# Patient Record
Sex: MALE | URBAN - METROPOLITAN AREA
[De-identification: names, ages, dates, MRNs, and addresses within clinical notes are randomized per-mention and may not be internally consistent; named-entity substitution may affect disease eponyms.]

---

## 2023-09-20 ENCOUNTER — APPOINTMENT (RX ONLY)
Dept: URBAN - METROPOLITAN AREA CLINIC 114 | Facility: CLINIC | Age: 48
Setting detail: DERMATOLOGY
End: 2023-09-20

## 2023-09-20 DIAGNOSIS — L0390 CELLULITIS AND ABSCESS OF UNSPECIFIED SITES: ICD-10-CM | Status: WORSENING

## 2023-09-20 DIAGNOSIS — L73.2 HIDRADENITIS SUPPURATIVA: ICD-10-CM | Status: WORSENING

## 2023-09-20 DIAGNOSIS — L0391 CELLULITIS AND ABSCESS OF UNSPECIFIED SITES: ICD-10-CM | Status: WORSENING

## 2023-09-20 PROBLEM — L03.114 CELLULITIS OF LEFT UPPER LIMB: Status: ACTIVE | Noted: 2023-09-20

## 2023-09-20 PROCEDURE — ? ADDITIONAL NOTES

## 2023-09-20 PROCEDURE — ? ORDER TESTS

## 2023-09-20 PROCEDURE — ? PRESCRIPTION

## 2023-09-20 PROCEDURE — ? COUNSELING

## 2023-09-20 PROCEDURE — 99204 OFFICE O/P NEW MOD 45 MIN: CPT

## 2023-09-20 RX ORDER — DOXYCYCLINE HYCLATE 100 MG/1
TABLET, COATED ORAL
Qty: 60 | Refills: 1 | Status: ERX | COMMUNITY
Start: 2023-09-20

## 2023-09-20 RX ADMIN — DOXYCYCLINE HYCLATE: 100 TABLET, COATED ORAL at 00:00

## 2023-09-20 ASSESSMENT — LOCATION ZONE DERM
LOCATION ZONE: TRUNK
LOCATION ZONE: LEG
LOCATION ZONE: ARM

## 2023-09-20 ASSESSMENT — LOCATION SIMPLE DESCRIPTION DERM
LOCATION SIMPLE: RIGHT BUTTOCK
LOCATION SIMPLE: LEFT UPPER ARM
LOCATION SIMPLE: RIGHT THIGH
LOCATION SIMPLE: LEFT BUTTOCK
LOCATION SIMPLE: LEFT THIGH

## 2023-09-20 ASSESSMENT — HURLEY STAGE
IN YOUR EXPERIENCE, AMONG ALL PATIENTS YOU HAVE SEEN WITH THIS CONDITION, HOW SEVERE IS THIS PATIENT'S CONDITION?: HURLEY STAGE III: MULTIPLE INTERCONNECTED SINUS TRACTS AND ABSCESSES THROUGHOUT AN ENTIRE AREA

## 2023-09-20 ASSESSMENT — LOCATION DETAILED DESCRIPTION DERM
LOCATION DETAILED: LEFT ANTERIOR PROXIMAL THIGH
LOCATION DETAILED: LEFT BUTTOCK
LOCATION DETAILED: LEFT ANTERIOR MEDIAL PROXIMAL UPPER ARM
LOCATION DETAILED: RIGHT BUTTOCK
LOCATION DETAILED: RIGHT ANTERIOR PROXIMAL THIGH

## 2023-09-20 NOTE — PROCEDURE: ADDITIONAL NOTES
Render Risk Assessment In Note?: no
Additional Notes: .\\n9/20\\nCurrently on Humira for rheumatoid arthritis and Crohn’s, pt has Crohn’s disease so cosentyx is not a good idea. For now we will see if the doxycycline helps and if it does we can do that on a low dose for longer term. Pt reports humira worsened his HS which is unfortunate, hopefully halina inhibitors will get approval for HS in near future that can also help his Crohn’s disease \\nRemicade helped HS but not Crohns
Detail Level: Simple

## 2023-09-20 NOTE — PROCEDURE: ORDER TESTS
Expected Date Of Service: 09/20/2023
Lab Facility: [id number]
Bill For Surgical Tray: no
Billing Type: Third-Party Bill
Performing Laboratory: [id number]

## 2023-09-22 ENCOUNTER — APPOINTMENT (RX ONLY)
Dept: URBAN - METROPOLITAN AREA CLINIC 114 | Facility: CLINIC | Age: 48
Setting detail: DERMATOLOGY
End: 2023-09-22

## 2023-09-22 DIAGNOSIS — L73.2 HIDRADENITIS SUPPURATIVA: ICD-10-CM

## 2023-09-22 DIAGNOSIS — L82.1 OTHER SEBORRHEIC KERATOSIS: ICD-10-CM

## 2023-09-22 DIAGNOSIS — L0391 CELLULITIS AND ABSCESS OF UNSPECIFIED SITES: ICD-10-CM

## 2023-09-22 DIAGNOSIS — L0390 CELLULITIS AND ABSCESS OF UNSPECIFIED SITES: ICD-10-CM

## 2023-09-22 PROBLEM — L02.414 CUTANEOUS ABSCESS OF LEFT UPPER LIMB: Status: ACTIVE | Noted: 2023-09-22

## 2023-09-22 PROBLEM — L03.114 CELLULITIS OF LEFT UPPER LIMB: Status: ACTIVE | Noted: 2023-09-22

## 2023-09-22 PROCEDURE — ? INTRALESIONAL KENALOG

## 2023-09-22 PROCEDURE — ? PRESCRIPTION MEDICATION MANAGEMENT

## 2023-09-22 PROCEDURE — ? COUNSELING

## 2023-09-22 PROCEDURE — 99214 OFFICE O/P EST MOD 30 MIN: CPT | Mod: 25

## 2023-09-22 PROCEDURE — ? ADDITIONAL NOTES

## 2023-09-22 PROCEDURE — 11900 INJECT SKIN LESIONS </W 7: CPT

## 2023-09-22 ASSESSMENT — LOCATION SIMPLE DESCRIPTION DERM
LOCATION SIMPLE: LEFT THIGH
LOCATION SIMPLE: RIGHT THIGH
LOCATION SIMPLE: LEFT UPPER ARM
LOCATION SIMPLE: RIGHT BUTTOCK
LOCATION SIMPLE: LEFT BUTTOCK

## 2023-09-22 ASSESSMENT — LOCATION DETAILED DESCRIPTION DERM
LOCATION DETAILED: LEFT BUTTOCK
LOCATION DETAILED: LEFT ANTERIOR PROXIMAL THIGH
LOCATION DETAILED: RIGHT BUTTOCK
LOCATION DETAILED: LEFT ANTERIOR MEDIAL PROXIMAL UPPER ARM
LOCATION DETAILED: RIGHT ANTERIOR PROXIMAL THIGH

## 2023-09-22 ASSESSMENT — LOCATION ZONE DERM
LOCATION ZONE: TRUNK
LOCATION ZONE: ARM
LOCATION ZONE: LEG

## 2023-09-22 NOTE — PROCEDURE: ADDITIONAL NOTES
Render Risk Assessment In Note?: no
Additional Notes: ..\\n.\\n9/22\\nI advised pt to think about Rinvoq for both Crohns (approved ) and for HS (currently under trials not approved), as halina inhibitors may help both conditions since he is struggling with HS \\n\\n9/20\\nCurrently on Humira for rheumatoid arthritis and Crohn’s, pt has Crohn’s disease so cosentyx is not a good idea. For now we will see if the doxycycline helps and if it does we can do that on a low dose for longer term. Pt reports humira worsened his HS which is unfortunate, hopefully halina inhibitors will get approval for HS in near future that can also help his Crohn’s disease \\nRemicade helped HS but not Crohns
Detail Level: Simple
Additional Notes: .\\n.\\nAttempted I&D but the picket of pus appears to be very deep, I can not access it with local numbing, explained to pt that as he has this happen before if it worsens or he develops fever he needs to go to ER for surgical evacuation of abscess in the OR

## 2023-09-22 NOTE — PROCEDURE: INTRALESIONAL KENALOG
Bill For Wasted Drug?: no
How Many Mls Were Removed From The 40 Mg/Ml (10ml) Vial When Preparing The Injectable Solution?: 0
Kenalog Preparation: Kenalog
Validate Note Data When Using Inventory: Yes
Total Volume Injected (Ccs- Only Use Numbers And Decimals): 2
Medical Necessity Clause: This procedure was medically necessary because the lesions that were treated were:
Which Kenalog Vial Was Used?: Kenalog 10 mg/ml (5 ml vial)
Kenalog Type Of Vial: Multiple Dose
Consent: The risks of atrophy were reviewed with the patient.
Size Of Lesion (Optional): 5.1
Detail Level: Detailed
Administered By (Optional): dr Ivory
X Size Of Lesion In Cm (Optional): 4
Concentration Of Solution Injected (Mg/Ml): 5.0
How Many Mls Were Removed From The 10 Mg/Ml (5ml) Vial When Preparing The Injectable Solution?: 1

## 2023-09-27 ENCOUNTER — APPOINTMENT (RX ONLY)
Dept: URBAN - METROPOLITAN AREA CLINIC 114 | Facility: CLINIC | Age: 48
Setting detail: DERMATOLOGY
End: 2023-09-27

## 2023-09-27 DIAGNOSIS — A49.02 METHICILLIN RESISTANT STAPHYLOCOCCUS AUREUS INFECTION, UNSPECIFIED SITE: ICD-10-CM | Status: IMPROVED

## 2023-09-27 DIAGNOSIS — L73.2 HIDRADENITIS SUPPURATIVA: ICD-10-CM

## 2023-09-27 PROCEDURE — ? COUNSELING

## 2023-09-27 PROCEDURE — ? ADDITIONAL NOTES

## 2023-09-27 PROCEDURE — ? PRESCRIPTION MEDICATION MANAGEMENT

## 2023-09-27 PROCEDURE — 99214 OFFICE O/P EST MOD 30 MIN: CPT

## 2023-09-27 ASSESSMENT — LOCATION DETAILED DESCRIPTION DERM
LOCATION DETAILED: RIGHT BUTTOCK
LOCATION DETAILED: LEFT ANTERIOR MEDIAL PROXIMAL UPPER ARM
LOCATION DETAILED: LEFT BUTTOCK
LOCATION DETAILED: RIGHT ANTERIOR PROXIMAL THIGH
LOCATION DETAILED: LEFT ANTERIOR PROXIMAL THIGH

## 2023-09-27 ASSESSMENT — LOCATION ZONE DERM
LOCATION ZONE: TRUNK
LOCATION ZONE: LEG
LOCATION ZONE: ARM

## 2023-09-27 ASSESSMENT — LOCATION SIMPLE DESCRIPTION DERM
LOCATION SIMPLE: LEFT THIGH
LOCATION SIMPLE: RIGHT THIGH
LOCATION SIMPLE: LEFT UPPER ARM
LOCATION SIMPLE: LEFT BUTTOCK
LOCATION SIMPLE: RIGHT BUTTOCK

## 2023-09-27 NOTE — PROCEDURE: ADDITIONAL NOTES
Additional Notes: .\\n.\\n9/27/23\\nIf it happens again he should get on antibiotics immediately so it does not evolve further as it is resistant to clindamycin and levofloxacin.
Render Risk Assessment In Note?: no
Detail Level: Simple
Additional Notes: .\\n.\\n9/27/23\\nWill keep doxycycline on hand, has a refill and he will call if he is running out so we can send in refills for him\\n..\\n.\\n9/22\\nI advised pt to think about Rinvoq for both Crohns (approved ) and for HS (currently under trials not approved), as halina inhibitors may help both conditions since he is struggling with HS \\n\\n9/20\\nCurrently on Humira for rheumatoid arthritis and Crohn’s, pt has Crohn’s disease so cosentyx is not a good idea. For now we will see if the doxycycline helps and if it does we can do that on a low dose for longer term. Pt reports humira worsened his HS which is unfortunate, hopefully halina inhibitors will get approval for HS in near future that can also help his Crohn’s disease \\nRemicade helped HS but not Crohns

## 2023-09-27 NOTE — PROCEDURE: MIPS QUALITY
From: Dorinda Lora  To: Jaki Reynoso  Sent: 4/14/2021 6:05 PM CDT  Subject: Upcoming Appointment    Forms for consultation on 4/20  
Quality 110: Preventive Care And Screening: Influenza Immunization: Influenza Immunization Administered during Influenza season
Detail Level: Detailed

## 2023-09-27 NOTE — PROCEDURE: PRESCRIPTION MEDICATION MANAGEMENT
Detail Level: Zone
Render In Strict Bullet Format?: No
Continue Regimen: Doxycycline 100 mg bid with each flare for 1-4 weeks depending on severity \\nPt is to continue Humira injections

## 2023-10-23 ENCOUNTER — APPOINTMENT (RX ONLY)
Dept: URBAN - METROPOLITAN AREA CLINIC 114 | Facility: CLINIC | Age: 48
Setting detail: DERMATOLOGY
End: 2023-10-23

## 2023-10-23 DIAGNOSIS — B07.8 OTHER VIRAL WARTS: ICD-10-CM

## 2023-10-23 DIAGNOSIS — L73.2 HIDRADENITIS SUPPURATIVA: ICD-10-CM | Status: IMPROVED

## 2023-10-23 PROCEDURE — 99214 OFFICE O/P EST MOD 30 MIN: CPT | Mod: 25

## 2023-10-23 PROCEDURE — ? PRESCRIPTION

## 2023-10-23 PROCEDURE — 17110 DESTRUCTION B9 LES UP TO 14: CPT

## 2023-10-23 PROCEDURE — ? COUNSELING

## 2023-10-23 PROCEDURE — ? ADDITIONAL NOTES

## 2023-10-23 PROCEDURE — ? PRESCRIPTION MEDICATION MANAGEMENT

## 2023-10-23 PROCEDURE — ? LIQUID NITROGEN

## 2023-10-23 RX ORDER — CLINDAMYCIN PHOSPHATE 10 MG/G
GEL TOPICAL
Qty: 60 | Refills: 6 | Status: ERX | COMMUNITY
Start: 2023-10-23

## 2023-10-23 RX ADMIN — CLINDAMYCIN PHOSPHATE: 10 GEL TOPICAL at 00:00

## 2023-10-23 ASSESSMENT — LOCATION ZONE DERM
LOCATION ZONE: ARM
LOCATION ZONE: TRUNK
LOCATION ZONE: LEG

## 2023-10-23 ASSESSMENT — LOCATION DETAILED DESCRIPTION DERM
LOCATION DETAILED: LEFT BUTTOCK
LOCATION DETAILED: RIGHT BUTTOCK
LOCATION DETAILED: RIGHT ANTERIOR PROXIMAL THIGH
LOCATION DETAILED: LEFT ANTERIOR MEDIAL PROXIMAL UPPER ARM
LOCATION DETAILED: LEFT ANTERIOR PROXIMAL THIGH
LOCATION DETAILED: RIGHT PROXIMAL DORSAL FOREARM

## 2023-10-23 ASSESSMENT — LOCATION SIMPLE DESCRIPTION DERM
LOCATION SIMPLE: LEFT UPPER ARM
LOCATION SIMPLE: LEFT BUTTOCK
LOCATION SIMPLE: RIGHT FOREARM
LOCATION SIMPLE: RIGHT THIGH
LOCATION SIMPLE: RIGHT BUTTOCK
LOCATION SIMPLE: LEFT THIGH

## 2023-10-23 NOTE — PROCEDURE: ADDITIONAL NOTES
Render Risk Assessment In Note?: no
Additional Notes: 10/23/23\\nThe doxycycline does help. The best way to treat the ones that are constant is to removed the entire tract by excising the area. We will give him information to a general surgeon.\\nHis doctor asked about combining biologics, it is not studied and it is not risk free so I do not believe it is a great idea since they are all immunosuppressants.\\n.\\n.\\n9/27/23\\nWill keep doxycycline on hand, has a refill and he will call if he is running out so we can send in refills for him\\n..\\n.\\n9/22\\nI advised pt to think about Rinvoq for both Crohns (approved ) and for HS (currently under trials not approved), as halina inhibitors may help both conditions since he is struggling with HS \\n\\n9/20\\nCurrently on Humira for rheumatoid arthritis and Crohn’s, pt has Crohn’s disease so cosentyx is not a good idea. For now we will see if the doxycycline helps and if it does we can do that on a low dose for longer term. Pt reports humira worsened his HS which is unfortunate, hopefully halina inhibitors will get approval for HS in near future that can also help his Crohn’s disease \\nRemicade helped HS but not Crohns
Detail Level: Simple

## 2023-10-23 NOTE — PROCEDURE: LIQUID NITROGEN
Spray Paint Text: The liquid nitrogen was applied to the skin utilizing a spray paint frosting technique.
Show Applicator Variable?: Yes
Add 52 Modifier (Optional): no
Consent: The patient's consent was obtained including but not limited to risks of crusting, scabbing, blistering, scarring, darker or lighter pigmentary change, recurrence, incomplete removal and infection.
Number Of Freeze-Thaw Cycles: 1 freeze-thaw cycle
Medical Necessity Clause: This procedure was medically necessary because the lesions that were treated were:
Medical Necessity Information: It is in your best interest to select a reason for this procedure from the list below. All of these items fulfill various CMS LCD requirements except the new and changing color options.
Detail Level: Detailed
Post-Care Instructions: I reviewed with the patient in detail post-care instructions. Patient is to wear sunprotection, and avoid picking at any of the treated lesions. Pt may apply Vaseline to crusted or scabbing areas.
Duration Of Freeze Thaw-Cycle (Seconds): 3

## 2023-10-23 NOTE — PROCEDURE: PRESCRIPTION MEDICATION MANAGEMENT
Initiate Treatment: clindamycin 1 % topical gel Apply to affected areas bid prn
Detail Level: Zone
Render In Strict Bullet Format?: No
Plan: Since doxycycline can cause sun sensitivity I recommend zinc sunscreens
Continue Regimen: Doxycycline 100 mg bid with each flare for 1-4 weeks depending on severity \\nPt is to continue Humira injections

## 2023-11-30 ENCOUNTER — APPOINTMENT (RX ONLY)
Dept: URBAN - METROPOLITAN AREA CLINIC 114 | Facility: CLINIC | Age: 48
Setting detail: DERMATOLOGY
End: 2023-11-30

## 2023-11-30 DIAGNOSIS — L08.9 LOCAL INFECTION OF THE SKIN AND SUBCUTANEOUS TISSUE, UNSPECIFIED: ICD-10-CM

## 2023-11-30 DIAGNOSIS — L40.8 OTHER PSORIASIS: ICD-10-CM

## 2023-11-30 DIAGNOSIS — R21 RASH AND OTHER NONSPECIFIC SKIN ERUPTION: ICD-10-CM

## 2023-11-30 DIAGNOSIS — L73.2 HIDRADENITIS SUPPURATIVA: ICD-10-CM

## 2023-11-30 PROCEDURE — ? COUNSELING

## 2023-11-30 PROCEDURE — ? PRESCRIPTION

## 2023-11-30 PROCEDURE — ? ADDITIONAL NOTES

## 2023-11-30 PROCEDURE — ? ORDER TESTS

## 2023-11-30 PROCEDURE — ? PRESCRIPTION MEDICATION MANAGEMENT

## 2023-11-30 PROCEDURE — 11104 PUNCH BX SKIN SINGLE LESION: CPT

## 2023-11-30 PROCEDURE — ? DIAGNOSIS COMMENT

## 2023-11-30 PROCEDURE — 99214 OFFICE O/P EST MOD 30 MIN: CPT | Mod: 25

## 2023-11-30 PROCEDURE — ? BIOPSY BY PUNCH METHOD

## 2023-11-30 RX ORDER — KETOCONAZOLE 20 MG/ML
SHAMPOO, SUSPENSION TOPICAL
Qty: 120 | Refills: 11 | Status: ERX | COMMUNITY
Start: 2023-11-30

## 2023-11-30 RX ORDER — FLUOCINOLONE ACETONIDE 0.11 MG/ML
OIL TOPICAL
Qty: 118.28 | Refills: 5 | Status: ERX | COMMUNITY
Start: 2023-11-30

## 2023-11-30 RX ADMIN — FLUOCINOLONE ACETONIDE: 0.11 OIL TOPICAL at 00:00

## 2023-11-30 RX ADMIN — KETOCONAZOLE: 20 SHAMPOO, SUSPENSION TOPICAL at 00:00

## 2023-11-30 ASSESSMENT — LOCATION SIMPLE DESCRIPTION DERM
LOCATION SIMPLE: RIGHT THIGH
LOCATION SIMPLE: RIGHT CHEEK
LOCATION SIMPLE: RIGHT SCALP
LOCATION SIMPLE: SCALP
LOCATION SIMPLE: LEFT BUTTOCK
LOCATION SIMPLE: LEFT UPPER ARM
LOCATION SIMPLE: LEFT THIGH
LOCATION SIMPLE: LEFT ANTERIOR NECK
LOCATION SIMPLE: RIGHT BUTTOCK

## 2023-11-30 ASSESSMENT — LOCATION DETAILED DESCRIPTION DERM
LOCATION DETAILED: LEFT ANTERIOR PROXIMAL THIGH
LOCATION DETAILED: LEFT CENTRAL FRONTAL SCALP
LOCATION DETAILED: RIGHT CENTRAL BUCCAL CHEEK
LOCATION DETAILED: LEFT ANTERIOR MEDIAL PROXIMAL UPPER ARM
LOCATION DETAILED: LEFT BUTTOCK
LOCATION DETAILED: LEFT INFERIOR LATERAL NECK
LOCATION DETAILED: LEFT SUPERIOR PARIETAL SCALP
LOCATION DETAILED: RIGHT MEDIAL FRONTAL SCALP
LOCATION DETAILED: RIGHT INFERIOR MEDIAL BUCCAL CHEEK
LOCATION DETAILED: RIGHT SUPERIOR LATERAL BUCCAL CHEEK
LOCATION DETAILED: RIGHT BUTTOCK
LOCATION DETAILED: RIGHT ANTERIOR PROXIMAL THIGH

## 2023-11-30 ASSESSMENT — LOCATION ZONE DERM
LOCATION ZONE: FACE
LOCATION ZONE: NECK
LOCATION ZONE: TRUNK
LOCATION ZONE: ARM
LOCATION ZONE: LEG
LOCATION ZONE: SCALP

## 2023-11-30 NOTE — PROCEDURE: ADDITIONAL NOTES
Render Risk Assessment In Note?: no
Additional Notes: 10/23/23\\nThe doxycycline does help. The best way to treat the ones that are constant is to removed the entire tract by excising the area. We will give him information to a general surgeon.\\nHis doctor asked about combining biologics, it is not studied and it is not risk free so I do not believe it is a great idea since they are all immunosuppressants.\\n.\\n.\\n9/27/23\\nWill keep doxycycline on hand, has a refill and he will call if he is running out so we can send in refills for him\\n..\\n.\\n9/22\\nI advised pt to think about Rinvoq for both Crohns (approved ) and for HS (currently under trials not approved), as halina inhibitors may help both conditions since he is struggling with HS \\n\\n9/20\\nCurrently on Humira for rheumatoid arthritis and Crohn’s, pt has Crohn’s disease so cosentyx is not a good idea. For now we will see if the doxycycline helps and if it does we can do that on a low dose for longer term. Pt reports humira worsened his HS which is unfortunate, hopefully halina inhibitors will get approval for HS in near future that can also help his Crohn’s disease \\nRemicade helped HS but not Crohns
Detail Level: Simple
Additional Notes: .\\n.\\n11/30\\nCould be psoriasis triggered by Humira. Pt is complex also has Crohn’s disease. Discussed Cosentyx approval for HS and psoriasis however it may make his Crohns worse which is not a good option for him. This is a challenging case and we may have to rely on topical agents for psoriasis and UVB. We have discussed rinvoq but again his case is complicated and it is not yet approved for HS.

## 2023-11-30 NOTE — PROCEDURE: BIOPSY BY PUNCH METHOD

## 2023-11-30 NOTE — PROCEDURE: DIAGNOSIS COMMENT
There is 1 Wet Read(s) to document.
Comment: Patient s already on Humira and discussed with the patient that we may have to switch him to Cosentyx
Detail Level: Simple
There are no Wet Read(s) to document.
Render Risk Assessment In Note?: no

## 2023-11-30 NOTE — PROCEDURE: PRESCRIPTION MEDICATION MANAGEMENT
Initiate Treatment: ketoconazole 2 % shampoo \\nQuantity: 120.0 ml  Days Supply: 30\\nSig: Shampoo three times a week. Leave on scalp and beard , lather for 5-10 minutes, then rinse.\\n\\nDerma-Smoothe/FS Scalp Oil 0.01 % \\nQuantity: 118.28 ml  Days Supply: 30\\nSig: Apply to dry flaky scalp and beard Qhs  x 2 weeks and cover with shower cap and rinse off in morning.
Render In Strict Bullet Format?: No
Detail Level: Zone
Plan: Since doxycycline can cause sun sensitivity I recommend zinc sunscreens
Continue Regimen: Doxycycline 100 mg bid with each flare for 1-4 weeks depending on severity \\nPt is to continue Humira injections\\n\\nclindamycin 1 % topical gel Apply to affected areas bid prn

## 2023-11-30 NOTE — PROCEDURE: ORDER TESTS
Billing Type: Third-Party Bill
Performing Laboratory: [id number]
Expected Date Of Service: 11/30/2023
Bill For Surgical Tray: no
Lab Facility: [id number]

## 2023-12-05 ENCOUNTER — RX ONLY (OUTPATIENT)
Age: 48
Setting detail: RX ONLY
End: 2023-12-05

## 2023-12-05 RX ORDER — SULFAMETHOXAZOLE AND TRIMETHOPRIM 800; 160 MG/1; MG/1
TABLET ORAL
Qty: 14 | Refills: 0 | Status: ERX | COMMUNITY
Start: 2023-12-05

## 2023-12-14 ENCOUNTER — APPOINTMENT (RX ONLY)
Dept: URBAN - METROPOLITAN AREA CLINIC 114 | Facility: CLINIC | Age: 48
Setting detail: DERMATOLOGY
End: 2023-12-14

## 2023-12-14 DIAGNOSIS — Z48.02 ENCOUNTER FOR REMOVAL OF SUTURES: ICD-10-CM

## 2023-12-14 DIAGNOSIS — L40.0 PSORIASIS VULGARIS: ICD-10-CM

## 2023-12-14 DIAGNOSIS — L40.8 OTHER PSORIASIS: ICD-10-CM | Status: IMPROVED

## 2023-12-14 DIAGNOSIS — L73.2 HIDRADENITIS SUPPURATIVA: ICD-10-CM

## 2023-12-14 PROCEDURE — ? SUTURE REMOVAL (NO GLOBAL PERIOD)

## 2023-12-14 PROCEDURE — ? COUNSELING

## 2023-12-14 PROCEDURE — ? PRESCRIPTION MEDICATION MANAGEMENT

## 2023-12-14 PROCEDURE — ? ADDITIONAL NOTES

## 2023-12-14 PROCEDURE — ? DIAGNOSIS COMMENT

## 2023-12-14 PROCEDURE — 99214 OFFICE O/P EST MOD 30 MIN: CPT

## 2023-12-14 ASSESSMENT — LOCATION DETAILED DESCRIPTION DERM
LOCATION DETAILED: LEFT BUTTOCK
LOCATION DETAILED: LEFT ANTERIOR MEDIAL PROXIMAL UPPER ARM
LOCATION DETAILED: LEFT SUPERIOR PARIETAL SCALP
LOCATION DETAILED: RIGHT BUTTOCK
LOCATION DETAILED: RIGHT MEDIAL FRONTAL SCALP
LOCATION DETAILED: RIGHT CENTRAL FRONTAL SCALP
LOCATION DETAILED: LEFT CENTRAL FRONTAL SCALP
LOCATION DETAILED: LEFT ANTERIOR PROXIMAL THIGH
LOCATION DETAILED: RIGHT ANTERIOR PROXIMAL THIGH

## 2023-12-14 ASSESSMENT — LOCATION ZONE DERM
LOCATION ZONE: ARM
LOCATION ZONE: SCALP
LOCATION ZONE: TRUNK
LOCATION ZONE: LEG

## 2023-12-14 ASSESSMENT — BSA PSORIASIS: % BODY COVERED IN PSORIASIS: 2

## 2023-12-14 ASSESSMENT — LOCATION SIMPLE DESCRIPTION DERM
LOCATION SIMPLE: RIGHT SCALP
LOCATION SIMPLE: LEFT BUTTOCK
LOCATION SIMPLE: LEFT THIGH
LOCATION SIMPLE: RIGHT THIGH
LOCATION SIMPLE: RIGHT BUTTOCK
LOCATION SIMPLE: SCALP
LOCATION SIMPLE: LEFT SCALP
LOCATION SIMPLE: LEFT UPPER ARM

## 2023-12-14 NOTE — PROCEDURE: DIAGNOSIS COMMENT
Comment: Patient s already on Humira and discussed with the patient that we may have to switch him to Cosentyx
Detail Level: Simple
Render Risk Assessment In Note?: no
Comment: Discussed the biopsy results with the patient and instructed him to see a rheumatologist to see if there is something else they would recommend.  Discussed possibly starting him on the Renvoke in the future

## 2023-12-14 NOTE — PROCEDURE: ADDITIONAL NOTES
Render Risk Assessment In Note?: no
Additional Notes: .\\n.\\n\\n12/13\\nDiscussed rinvoq if rash worsens and he needs to switch off of humira for his Crohn’s disease, rinvoq could make his HS better possibly though not yet fda approved for this indication\\n\\n11/30\\nCould be psoriasis triggered by Humira. Pt is complex also has Crohn’s disease. Discussed Cosentyx approval for HS and psoriasis however it may make his Crohns worse which is not a good option for him. This is a challenging case and we may have to rely on topical agents for psoriasis and UVB. We have discussed rinvoq but again his case is complicated and it is not yet approved for HS.
Detail Level: Simple
Additional Notes: 10/23/23\\nThe doxycycline does help. The best way to treat the ones that are constant is to removed the entire tract by excising the area. We will give him information to a general surgeon.\\nHis doctor asked about combining biologics, it is not studied and it is not risk free so I do not believe it is a great idea since they are all immunosuppressants.\\n.\\n.\\n9/27/23\\nWill keep doxycycline on hand, has a refill and he will call if he is running out so we can send in refills for him\\n..\\n.\\n9/22\\nI advised pt to think about Rinvoq for both Crohns (approved ) and for HS (currently under trials not approved), as halina inhibitors may help both conditions since he is struggling with HS \\n\\n9/20\\nCurrently on Humira for rheumatoid arthritis and Crohn’s, pt has Crohn’s disease so cosentyx is not a good idea. For now we will see if the doxycycline helps and if it does we can do that on a low dose for longer term. Pt reports humira worsened his HS which is unfortunate, hopefully halina inhibitors will get approval for HS in near future that can also help his Crohn’s disease \\nRemicade helped HS but not Crohns

## 2024-01-23 ENCOUNTER — APPOINTMENT (RX ONLY)
Dept: URBAN - METROPOLITAN AREA CLINIC 114 | Facility: CLINIC | Age: 49
Setting detail: DERMATOLOGY
End: 2024-01-23

## 2024-01-23 DIAGNOSIS — D49.2 NEOPLASM OF UNSPECIFIED BEHAVIOR OF BONE, SOFT TISSUE, AND SKIN: ICD-10-CM

## 2024-01-23 DIAGNOSIS — L73.2 HIDRADENITIS SUPPURATIVA: ICD-10-CM

## 2024-01-23 DIAGNOSIS — L40.0 PSORIASIS VULGARIS: ICD-10-CM

## 2024-01-23 DIAGNOSIS — L40.8 OTHER PSORIASIS: ICD-10-CM | Status: INADEQUATELY CONTROLLED

## 2024-01-23 PROCEDURE — 11307 SHAVE SKIN LESION 1.1-2.0 CM: CPT

## 2024-01-23 PROCEDURE — ? ADDITIONAL NOTES

## 2024-01-23 PROCEDURE — ? PRESCRIPTION MEDICATION MANAGEMENT

## 2024-01-23 PROCEDURE — ? DIAGNOSIS COMMENT

## 2024-01-23 PROCEDURE — ? SHAVE REMOVAL (NO PATHOLOGY)

## 2024-01-23 PROCEDURE — ? PRESCRIPTION

## 2024-01-23 PROCEDURE — 99214 OFFICE O/P EST MOD 30 MIN: CPT | Mod: 25

## 2024-01-23 PROCEDURE — ? COUNSELING

## 2024-01-23 RX ORDER — CLOBETASOL PROPIONATE 0.5 MG/G
AEROSOL, FOAM TOPICAL
Qty: 50 | Refills: 3 | Status: ERX | COMMUNITY
Start: 2024-01-23

## 2024-01-23 RX ADMIN — CLOBETASOL PROPIONATE: 0.5 AEROSOL, FOAM TOPICAL at 00:00

## 2024-01-23 ASSESSMENT — LOCATION DETAILED DESCRIPTION DERM
LOCATION DETAILED: RIGHT BUTTOCK
LOCATION DETAILED: RIGHT SUPERIOR MEDIAL BUCCAL CHEEK
LOCATION DETAILED: LEFT SUPERIOR PARIETAL SCALP
LOCATION DETAILED: LEFT ANTERIOR PROXIMAL THIGH
LOCATION DETAILED: RIGHT CENTRAL FRONTAL SCALP
LOCATION DETAILED: LEFT BUTTOCK
LOCATION DETAILED: RIGHT MEDIAL FRONTAL SCALP
LOCATION DETAILED: LEFT SUPERIOR POSTAURICULAR SKIN
LOCATION DETAILED: LEFT ANTERIOR MEDIAL PROXIMAL UPPER ARM
LOCATION DETAILED: RIGHT ANTERIOR PROXIMAL THIGH

## 2024-01-23 ASSESSMENT — LOCATION SIMPLE DESCRIPTION DERM
LOCATION SIMPLE: LEFT BUTTOCK
LOCATION SIMPLE: RIGHT THIGH
LOCATION SIMPLE: LEFT THIGH
LOCATION SIMPLE: SCALP
LOCATION SIMPLE: LEFT UPPER ARM
LOCATION SIMPLE: RIGHT CHEEK
LOCATION SIMPLE: RIGHT SCALP
LOCATION SIMPLE: RIGHT BUTTOCK

## 2024-01-23 ASSESSMENT — LOCATION ZONE DERM
LOCATION ZONE: ARM
LOCATION ZONE: SCALP
LOCATION ZONE: FACE
LOCATION ZONE: LEG
LOCATION ZONE: TRUNK

## 2024-01-23 NOTE — PROCEDURE: ADDITIONAL NOTES
Render Risk Assessment In Note?: no
Additional Notes: 1/23/24\\nPatient stated that his rheumatologist hanged him from Humira to Rinvoq.  Patient has been on it x 3 weeks.\\n.\\n\\n12/13\\nDiscussed rinvoq if rash worsens and he needs to switch off of humira for his Crohn’s disease, rinvoq could make his HS better possibly though not yet fda approved for this indication\\n\\n11/30\\nCould be psoriasis triggered by Humira. Pt is complex also has Crohn’s disease. Discussed Cosentyx approval for HS and psoriasis however it may make his Crohns worse which is not a good option for him. This is a challenging case and we may have to rely on topical agents for psoriasis and UVB. We have discussed rinvoq but again his case is complicated and it is not yet approved for HS.
Detail Level: Simple
Additional Notes: 10/23/23\\nThe doxycycline does help. The best way to treat the ones that are constant is to removed the entire tract by excising the area. We will give him information to a general surgeon.\\nHis doctor asked about combining biologics, it is not studied and it is not risk free so I do not believe it is a great idea since they are all immunosuppressants.\\n.\\n.\\n9/27/23\\nWill keep doxycycline on hand, has a refill and he will call if he is running out so we can send in refills for him\\n..\\n.\\n9/22\\nI advised pt to think about Rinvoq for both Crohns (approved ) and for HS (currently under trials not approved), as halina inhibitors may help both conditions since he is struggling with HS \\n\\n9/20\\nCurrently on Humira for rheumatoid arthritis and Crohn’s, pt has Crohn’s disease so cosentyx is not a good idea. For now we will see if the doxycycline helps and if it does we can do that on a low dose for longer term. Pt reports humira worsened his HS which is unfortunate, hopefully halina inhibitors will get approval for HS in near future that can also help his Crohn’s disease \\nRemicade helped HS but not Crohns

## 2024-01-23 NOTE — PROCEDURE: DIAGNOSIS COMMENT
Detail Level: Simple
Render Risk Assessment In Note?: no
Comment: Discussed the biopsy results with the patient and instructed him to see a rheumatologist to see if there is something else they would recommend.  Discussed possibly starting him on the Renvoke in the future

## 2024-01-23 NOTE — PROCEDURE: COUNSELING
Detail Level: Detailed
Medical Necessity Information: It is in your best interest to select a reason for this procedure from the list below. All of these items fulfill various CMS LCD requirements except the new and changing color options.
Include Z78.9 (Other Specified Conditions Influencing Health Status) As An Associated Diagnosis?: No
Medical Necessity Clause: This procedure was medically necessary because the lesion that was treated was:
Size Of Lesion In Cm: 1.2
Size Of Margin In Cm: 0.1
Anesthesia Type: 1% lidocaine with epinephrine
Hemostasis: Drysol
Wound Care: Petrolatum
Consent was obtained from the patient. The risks and benefits to therapy were discussed in detail. Specifically, the risks of infection, scarring, bleeding, prolonged wound healing, incomplete removal, allergy to anesthesia, nerve injury and recurrence were addressed. Prior to the procedure, the treatment site was clearly identified and confirmed by the patient. All components of Universal Protocol/PAUSE Rule completed.
Post-Care Instructions: I reviewed with the patient in detail post-care instructions. Patient is to keep the biopsy site dry overnight, and then apply bacitracin twice daily until healed. Patient may apply hydrogen peroxide soaks to remove any crusting.

## 2024-01-23 NOTE — PROCEDURE: PRESCRIPTION MEDICATION MANAGEMENT
Continue Regimen: ketoconazole 2 % shampoo \\nQuantity: 120.0 ml  Days Supply: 30\\nSig: Shampoo three times a week. Leave on scalp and beard , lather for 5-10 minutes, then rinse.\\n\\nDerma-Smoothe/FS Scalp Oil 0.01 % \\nQuantity: 118.28 ml  Days Supply: 30\\nSig: Apply to dry flaky scalp and beard Qhs  x 2 weeks and cover with shower cap and rinse off in morning.
Initiate Treatment: clobetasol 0.05 % topical foam \\nQuantity: 50.0 g  Days Supply: 30\\nSig: Apply to affected areas on scalp and beard bid prn flares
Render In Strict Bullet Format?: No
Detail Level: Zone
Discontinue Regimen: Humira
Plan: Since doxycycline can cause sun sensitivity I recommend zinc sunscreens
Continue Regimen: Doxycycline 100 mg bid with each flare for 1-4 weeks depending on severity \\nPt is to continue Rinvoq injections (prescribed by his rheumatologist)\\n\\nclindamycin 1 % topical gel Apply to affected areas bid prn

## 2024-01-23 NOTE — PROCEDURE: SHAVE REMOVAL (NO PATHOLOGY)
Medical Necessity Information: It is in your best interest to select a reason for this procedure from the list below. All of these items fulfill various CMS LCD requirements except the new and changing color options.
Include Z78.9 (Other Specified Conditions Influencing Health Status) As An Associated Diagnosis?: No
Medical Necessity Clause: This procedure was medically necessary because the lesion that was treated was:
Detail Level: Detailed
Size Of Lesion In Cm: 1.2
Size Of Margin In Cm: 0.1
Anesthesia Type: 1% lidocaine with epinephrine
Hemostasis: Drysol
Wound Care: Petrolatum
Consent was obtained from the patient. The risks and benefits to therapy were discussed in detail. Specifically, the risks of infection, scarring, bleeding, prolonged wound healing, incomplete removal, allergy to anesthesia, nerve injury and recurrence were addressed. Prior to the procedure, the treatment site was clearly identified and confirmed by the patient. All components of Universal Protocol/PAUSE Rule completed.
Post-Care Instructions: I reviewed with the patient in detail post-care instructions. Patient is to keep the biopsy site dry overnight, and then apply bacitracin twice daily until healed. Patient may apply hydrogen peroxide soaks to remove any crusting.

## 2024-03-14 ENCOUNTER — APPOINTMENT (RX ONLY)
Dept: URBAN - METROPOLITAN AREA CLINIC 114 | Facility: CLINIC | Age: 49
Setting detail: DERMATOLOGY
End: 2024-03-14

## 2024-03-14 DIAGNOSIS — D22 MELANOCYTIC NEVI: ICD-10-CM

## 2024-03-14 DIAGNOSIS — L40.8 OTHER PSORIASIS: ICD-10-CM | Status: RESOLVING

## 2024-03-14 DIAGNOSIS — L81.4 OTHER MELANIN HYPERPIGMENTATION: ICD-10-CM

## 2024-03-14 DIAGNOSIS — D18.0 HEMANGIOMA: ICD-10-CM

## 2024-03-14 DIAGNOSIS — B07.8 OTHER VIRAL WARTS: ICD-10-CM

## 2024-03-14 DIAGNOSIS — L73.2 HIDRADENITIS SUPPURATIVA: ICD-10-CM | Status: IMPROVED

## 2024-03-14 DIAGNOSIS — L57.8 OTHER SKIN CHANGES DUE TO CHRONIC EXPOSURE TO NONIONIZING RADIATION: ICD-10-CM

## 2024-03-14 DIAGNOSIS — Z71.89 OTHER SPECIFIED COUNSELING: ICD-10-CM

## 2024-03-14 DIAGNOSIS — Z12.83 ENCOUNTER FOR SCREENING FOR MALIGNANT NEOPLASM OF SKIN: ICD-10-CM

## 2024-03-14 DIAGNOSIS — L82.1 OTHER SEBORRHEIC KERATOSIS: ICD-10-CM

## 2024-03-14 PROBLEM — D22.5 MELANOCYTIC NEVI OF TRUNK: Status: ACTIVE | Noted: 2024-03-14

## 2024-03-14 PROBLEM — D18.01 HEMANGIOMA OF SKIN AND SUBCUTANEOUS TISSUE: Status: ACTIVE | Noted: 2024-03-14

## 2024-03-14 PROCEDURE — ? PRESCRIPTION MEDICATION MANAGEMENT

## 2024-03-14 PROCEDURE — ? COUNSELING

## 2024-03-14 PROCEDURE — ? ADDITIONAL NOTES

## 2024-03-14 PROCEDURE — ? PRESCRIPTION

## 2024-03-14 PROCEDURE — 99214 OFFICE O/P EST MOD 30 MIN: CPT

## 2024-03-14 PROCEDURE — ? DIAGNOSIS COMMENT

## 2024-03-14 RX ORDER — DOXYCYCLINE HYCLATE 100 MG/1
CAPSULE, GELATIN COATED ORAL
Qty: 60 | Refills: 1 | Status: ERX | COMMUNITY
Start: 2024-03-14

## 2024-03-14 RX ORDER — CLINDAMYCIN PHOSPHATE 10 MG/G
GEL TOPICAL
Qty: 60 | Refills: 6 | Status: ERX

## 2024-03-14 RX ADMIN — DOXYCYCLINE HYCLATE: 100 CAPSULE, GELATIN COATED ORAL at 00:00

## 2024-03-14 ASSESSMENT — LOCATION DETAILED DESCRIPTION DERM
LOCATION DETAILED: LEFT ULNAR DORSAL HAND
LOCATION DETAILED: LEFT POSTERIOR SHOULDER
LOCATION DETAILED: RIGHT ANTERIOR PROXIMAL THIGH
LOCATION DETAILED: LEFT ANTERIOR MEDIAL PROXIMAL UPPER ARM
LOCATION DETAILED: RIGHT BUTTOCK
LOCATION DETAILED: LEFT SUPERIOR PARIETAL SCALP
LOCATION DETAILED: LEFT BUTTOCK
LOCATION DETAILED: LEFT ANTERIOR PROXIMAL THIGH
LOCATION DETAILED: RIGHT MEDIAL FRONTAL SCALP
LOCATION DETAILED: LEFT INFERIOR UPPER BACK
LOCATION DETAILED: LEFT PLANTAR FOREFOOT OVERLYING 3RD METATARSAL
LOCATION DETAILED: RIGHT RIB CAGE
LOCATION DETAILED: RIGHT SUPERIOR LATERAL MIDBACK

## 2024-03-14 ASSESSMENT — LOCATION SIMPLE DESCRIPTION DERM
LOCATION SIMPLE: LEFT THIGH
LOCATION SIMPLE: LEFT BUTTOCK
LOCATION SIMPLE: LEFT HAND
LOCATION SIMPLE: LEFT PLANTAR SURFACE
LOCATION SIMPLE: RIGHT SCALP
LOCATION SIMPLE: LEFT UPPER BACK
LOCATION SIMPLE: ABDOMEN
LOCATION SIMPLE: RIGHT LOWER BACK
LOCATION SIMPLE: RIGHT THIGH
LOCATION SIMPLE: LEFT SHOULDER
LOCATION SIMPLE: SCALP
LOCATION SIMPLE: LEFT UPPER ARM
LOCATION SIMPLE: RIGHT BUTTOCK

## 2024-03-14 ASSESSMENT — LOCATION ZONE DERM
LOCATION ZONE: FEET
LOCATION ZONE: TRUNK
LOCATION ZONE: SCALP
LOCATION ZONE: HAND
LOCATION ZONE: LEG
LOCATION ZONE: ARM

## 2024-03-14 NOTE — PROCEDURE: ADDITIONAL NOTES
Render Risk Assessment In Note?: no
Additional Notes: 3/14/24\\nPatient states scalp serum was not covered under insurance. He is using tea tree oil in place of it. He is using shampoo and clobetasol. Continue using the foam until redness and irritation are relieved. \\n\\n1/23/24\\nPatient stated that his rheumatologist hanged him from Humira to Rinvoq.  Patient has been on it x 3 weeks.\\n.\\n\\n12/13\\nDiscussed rinvoq if rash worsens and he needs to switch off of humira for his Crohn’s disease, rinvoq could make his HS better possibly though not yet fda approved for this indication\\n\\n11/30\\nCould be psoriasis triggered by Humira. Pt is complex also has Crohn’s disease. Discussed Cosentyx approval for HS and psoriasis however it may make his Crohns worse which is not a good option for him. This is a challenging case and we may have to rely on topical agents for psoriasis and UVB. We have discussed rinvoq but again his case is complicated and it is not yet approved for HS.
Detail Level: Simple
Additional Notes: 10/23/23\\nThe doxycycline does help. The best way to treat the ones that are constant is to removed the entire tract by excising the area. We will give him information to a general surgeon.\\nHis doctor asked about combining biologics, it is not studied and it is not risk free so I do not believe it is a great idea since they are all immunosuppressants.\\n.\\n.\\n9/27/23\\nWill keep doxycycline on hand, has a refill and he will call if he is running out so we can send in refills for him\\n..\\n.\\n9/22\\nI advised pt to think about Rinvoq for both Crohns (approved ) and for HS (currently under trials not approved), as halina inhibitors may help both conditions since he is struggling with HS \\n\\n9/20\\nCurrently on Humira for rheumatoid arthritis and Crohn’s, pt has Crohn’s disease so cosentyx is not a good idea. For now we will see if the doxycycline helps and if it does we can do that on a low dose for longer term. Pt reports humira worsened his HS which is unfortunate, hopefully halina inhibitors will get approval for HS in near future that can also help his Crohn’s disease \\nRemicade helped HS but not Crohns

## 2024-03-14 NOTE — PROCEDURE: PRESCRIPTION MEDICATION MANAGEMENT
Continue Regimen: clobetasol 0.05 % topical foam \\nQuantity: 50.0 g  Days Supply: 30\\nSig: Apply to affected areas on scalp and beard bid prn flares\\n\\nketoconazole 2 % shampoo \\nQuantity: 120.0 ml  Days Supply: 30\\nSig: Shampoo three times a week. Leave on scalp and beard , lather for 5-10 minutes, then rinse.
Render In Strict Bullet Format?: No
Detail Level: Zone
Discontinue Regimen: Humira
Plan: Since doxycycline can cause sun sensitivity I recommend zinc sunscreens
Continue Regimen: Doxycycline 100 mg bid with each flare for 1-4 weeks depending on severity \\nPt is to continue Rinvoq injections (prescribed by his rheumatologist)\\n\\nclindamycin 1 % topical gel Apply to affected areas bid prn

## 2024-03-14 NOTE — PROCEDURE: DIAGNOSIS COMMENT
Detail Level: Simple
Render Risk Assessment In Note?: no
Comment: .\\nPt is now on rinvoq by GI that has improved HS though still flares

## 2024-04-15 ENCOUNTER — APPOINTMENT (RX ONLY)
Dept: URBAN - METROPOLITAN AREA CLINIC 114 | Facility: CLINIC | Age: 49
Setting detail: DERMATOLOGY
End: 2024-04-15

## 2024-04-15 DIAGNOSIS — L72.8 OTHER FOLLICULAR CYSTS OF THE SKIN AND SUBCUTANEOUS TISSUE: ICD-10-CM

## 2024-04-15 DIAGNOSIS — L0391 CELLULITIS AND ABSCESS OF UNSPECIFIED SITES: ICD-10-CM

## 2024-04-15 DIAGNOSIS — L73.2 HIDRADENITIS SUPPURATIVA: ICD-10-CM

## 2024-04-15 DIAGNOSIS — L40.8 OTHER PSORIASIS: ICD-10-CM

## 2024-04-15 DIAGNOSIS — L0390 CELLULITIS AND ABSCESS OF UNSPECIFIED SITES: ICD-10-CM

## 2024-04-15 PROBLEM — L02.416 CUTANEOUS ABSCESS OF LEFT LOWER LIMB: Status: ACTIVE | Noted: 2024-04-15

## 2024-04-15 PROCEDURE — ? ORDER TESTS

## 2024-04-15 PROCEDURE — ? PRESCRIPTION

## 2024-04-15 PROCEDURE — ? ADDITIONAL NOTES

## 2024-04-15 PROCEDURE — ? DIAGNOSIS COMMENT

## 2024-04-15 PROCEDURE — ? INCISION AND DRAINAGE

## 2024-04-15 PROCEDURE — 10060 I&D ABSCESS SIMPLE/SINGLE: CPT

## 2024-04-15 PROCEDURE — ? COUNSELING

## 2024-04-15 PROCEDURE — 99214 OFFICE O/P EST MOD 30 MIN: CPT | Mod: 25

## 2024-04-15 PROCEDURE — 11900 INJECT SKIN LESIONS </W 7: CPT

## 2024-04-15 PROCEDURE — ? INTRALESIONAL KENALOG

## 2024-04-15 PROCEDURE — ? PRESCRIPTION MEDICATION MANAGEMENT

## 2024-04-15 RX ORDER — SULFAMETHOXAZOLE AND TRIMETHOPRIM 800; 160 MG/1; MG/1
TABLET ORAL
Qty: 28 | Refills: 0 | Status: ERX

## 2024-04-15 ASSESSMENT — LOCATION ZONE DERM
LOCATION ZONE: ARM
LOCATION ZONE: TRUNK
LOCATION ZONE: SCALP
LOCATION ZONE: LEG

## 2024-04-15 ASSESSMENT — LOCATION SIMPLE DESCRIPTION DERM
LOCATION SIMPLE: RIGHT THIGH
LOCATION SIMPLE: LEFT THIGH
LOCATION SIMPLE: LEFT BUTTOCK
LOCATION SIMPLE: SCALP
LOCATION SIMPLE: LEFT UPPER ARM
LOCATION SIMPLE: RIGHT BUTTOCK
LOCATION SIMPLE: RIGHT SCALP

## 2024-04-15 ASSESSMENT — LOCATION DETAILED DESCRIPTION DERM
LOCATION DETAILED: RIGHT BUTTOCK
LOCATION DETAILED: LEFT BUTTOCK
LOCATION DETAILED: LEFT ANTERIOR PROXIMAL THIGH
LOCATION DETAILED: RIGHT MEDIAL FRONTAL SCALP
LOCATION DETAILED: LEFT ANTERIOR MEDIAL PROXIMAL UPPER ARM
LOCATION DETAILED: LEFT SUPERIOR PARIETAL SCALP
LOCATION DETAILED: RIGHT ANTERIOR PROXIMAL THIGH

## 2024-04-15 NOTE — PROCEDURE: PRESCRIPTION MEDICATION MANAGEMENT
Continue Regimen: clobetasol 0.05 % topical foam \\nQuantity: 50.0 g  Days Supply: 30\\nSig: Apply to affected areas on scalp and beard bid prn flares\\n\\nketoconazole 2 % shampoo \\nQuantity: 120.0 ml  Days Supply: 30\\nSig: Shampoo three times a week. Leave on scalp and beard , lather for 5-10 minutes, then rinse.
Render In Strict Bullet Format?: No
Detail Level: Zone
Discontinue Regimen: Doxycycline 100 mg bid with each flare for 1-4 weeks depending on severity
Initiate Treatment: Bactrim  mg-160 mg tablet \\nQuantity: 28.0 Tablet\\nSig: Take one PO BID with food x 2 weeks
Plan: Since doxycycline can cause sun sensitivity I recommend zinc sunscreens
Continue Regimen: Pt is to continue Rinvoq injections (prescribed by his rheumatologist)\\n\\nclindamycin 1 % topical gel Apply to affected areas bid prn

## 2024-04-15 NOTE — PROCEDURE: ADDITIONAL NOTES
Render Risk Assessment In Note?: no
Additional Notes: 3/14/24\\nPatient states scalp serum was not covered under insurance. He is using tea tree oil in place of it. He is using shampoo and clobetasol. Continue using the foam until redness and irritation are relieved. \\n\\n1/23/24\\nPatient stated that his rheumatologist hanged him from Humira to Rinvoq.  Patient has been on it x 3 weeks.\\n.\\n\\n12/13\\nDiscussed rinvoq if rash worsens and he needs to switch off of humira for his Crohn’s disease, rinvoq could make his HS better possibly though not yet fda approved for this indication\\n\\n11/30\\nCould be psoriasis triggered by Humira. Pt is complex also has Crohn’s disease. Discussed Cosentyx approval for HS and psoriasis however it may make his Crohns worse which is not a good option for him. This is a challenging case and we may have to rely on topical agents for psoriasis and UVB. We have discussed rinvoq but again his case is complicated and it is not yet approved for HS.
Detail Level: Simple
Additional Notes: 4/15/24\\nPatient ststaed that he is currently on Doxycycline.  Patient states that the cyst started draining last night \\n\\n\\n\\n10/23/23\\nThe doxycycline does help. The best way to treat the ones that are constant is to removed the entire tract by excising the area. We will give him information to a general surgeon.\\nHis doctor asked about combining biologics, it is not studied and it is not risk free so I do not believe it is a great idea since they are all immunosuppressants.\\n.\\n.\\n9/27/23\\nWill keep doxycycline on hand, has a refill and he will call if he is running out so we can send in refills for him\\n..\\n.\\n9/22\\nI advised pt to think about Rinvoq for both Crohns (approved ) and for HS (currently under trials not approved), as halina inhibitors may help both conditions since he is struggling with HS \\n\\n9/20\\nCurrently on Humira for rheumatoid arthritis and Crohn’s, pt has Crohn’s disease so cosentyx is not a good idea. For now we will see if the doxycycline helps and if it does we can do that on a low dose for longer term. Pt reports humira worsened his HS which is unfortunate, hopefully halina inhibitors will get approval for HS in near future that can also help his Crohn’s disease \\nRemicade helped HS but not Crohns

## 2024-04-15 NOTE — PROCEDURE: INTRALESIONAL KENALOG
Bill For Wasted Drug (Kenalog)?: no
How Many Mls Were Removed From The 80 Mg/Ml (5ml) Vial When Preparing The Injectable Solution?: 0
Kenalog Type Of Vial: Multiple Dose
Administered By (Optional): dr Ivory
Medical Necessity Clause: This procedure was medically necessary because the lesions that were treated were:
Concentration Of Kenalog Solution Injected (Mg/Ml): 10.0
Expiration Date For Kenalog (Optional): 12/2025
Total Volume (Ccs): 1.5
Validate Note Data When Using Inventory: Yes
Detail Level: Detailed
Consent: The risks of atrophy were reviewed with the patient.
Kenalog Preparation: Kenalog
Which Kenalog Vial Was Used?: Kenalog 10 mg/ml (5 ml vial)
Lot # For Kenalog (Optional): [id number]

## 2024-04-15 NOTE — PROCEDURE: INCISION AND DRAINAGE
Detail Level: Detailed
Lesion Type: Abscess
Method: 11 blade
Curette: No
Anesthesia Type: 1% lidocaine with epinephrine
Anesthesia Volume In Cc: 8
Size Of Lesion In Cm (Optional But May Be Required For Some Insurances): 1
Drainage Type?: purulent
Wound Care: Petrolatum
Dressing: dry sterile dressing
Epidermal Sutures: 4-0 Ethilon
Epidermal Closure: simple interrupted
Suture Text: The incision was partially closed with
Preparation Text: The area was prepped in the usual clean fashion.
Curette Text (Optional): After the contents were expressed a curette was used to partially remove the cyst wall.
Consent was obtained and risks were reviewed including but not limited to delayed wound healing, infection, need for multiple I and D's, and pain.
Post-Care Instructions: I reviewed with the patient in detail post-care instructions. Patient should keep wound covered and call the office should any redness, pain, swelling or worsening occur.

## 2024-04-15 NOTE — PROCEDURE: ORDER TESTS
Lab Facility: [id number]
Bill For Surgical Tray: no
Billing Type: Third-Party Bill
Performing Laboratory: [id number]
Expected Date Of Service: 04/15/2024

## 2024-04-29 ENCOUNTER — APPOINTMENT (RX ONLY)
Dept: URBAN - METROPOLITAN AREA CLINIC 114 | Facility: CLINIC | Age: 49
Setting detail: DERMATOLOGY
End: 2024-04-29

## 2024-04-29 DIAGNOSIS — L40.8 OTHER PSORIASIS: ICD-10-CM

## 2024-04-29 DIAGNOSIS — L0390 CELLULITIS AND ABSCESS OF UNSPECIFIED SITES: ICD-10-CM | Status: RESOLVING

## 2024-04-29 DIAGNOSIS — L73.2 HIDRADENITIS SUPPURATIVA: ICD-10-CM

## 2024-04-29 DIAGNOSIS — L0391 CELLULITIS AND ABSCESS OF UNSPECIFIED SITES: ICD-10-CM | Status: RESOLVING

## 2024-04-29 PROBLEM — L02.91 CUTANEOUS ABSCESS, UNSPECIFIED: Status: ACTIVE | Noted: 2024-04-29

## 2024-04-29 PROCEDURE — ? COUNSELING

## 2024-04-29 PROCEDURE — ? ADDITIONAL NOTES

## 2024-04-29 PROCEDURE — ? DIAGNOSIS COMMENT

## 2024-04-29 PROCEDURE — ? PRESCRIPTION MEDICATION MANAGEMENT

## 2024-04-29 PROCEDURE — 99214 OFFICE O/P EST MOD 30 MIN: CPT

## 2024-04-29 ASSESSMENT — LOCATION ZONE DERM
LOCATION ZONE: ARM
LOCATION ZONE: TRUNK
LOCATION ZONE: LEG
LOCATION ZONE: SCALP

## 2024-04-29 ASSESSMENT — LOCATION DETAILED DESCRIPTION DERM
LOCATION DETAILED: RIGHT MEDIAL FRONTAL SCALP
LOCATION DETAILED: RIGHT BUTTOCK
LOCATION DETAILED: LEFT BUTTOCK
LOCATION DETAILED: LEFT ANTERIOR PROXIMAL THIGH
LOCATION DETAILED: LEFT SUPERIOR PARIETAL SCALP
LOCATION DETAILED: RIGHT ANTERIOR PROXIMAL THIGH
LOCATION DETAILED: LEFT ANTERIOR MEDIAL PROXIMAL UPPER ARM

## 2024-04-29 ASSESSMENT — LOCATION SIMPLE DESCRIPTION DERM
LOCATION SIMPLE: LEFT UPPER ARM
LOCATION SIMPLE: LEFT THIGH
LOCATION SIMPLE: RIGHT THIGH
LOCATION SIMPLE: RIGHT BUTTOCK
LOCATION SIMPLE: LEFT BUTTOCK
LOCATION SIMPLE: SCALP
LOCATION SIMPLE: RIGHT SCALP

## 2024-04-29 NOTE — PROCEDURE: ADDITIONAL NOTES
Render Risk Assessment In Note?: no
Additional Notes: 4/29/24\\nPt states the inflamed lesion is doing much better, over the weekend he had a second lesion flare to the size of a “small melon” he stated. It has since calmed down but is still firm. No need for an ilk today, the bactrim seems to be helping. He is still taking the Rinvoq and believes it has help tremendously. Will follow up as needed for now.\\n\\n\\n4/15/24\\nPatient ststaed that he is currently on Doxycycline.  Patient states that the cyst started draining last night \\n\\n\\n\\n10/23/23\\nThe doxycycline does help. The best way to treat the ones that are constant is to removed the entire tract by excising the area. We will give him information to a general surgeon.\\nHis doctor asked about combining biologics, it is not studied and it is not risk free so I do not believe it is a great idea since they are all immunosuppressants.\\n.\\n.\\n9/27/23\\nWill keep doxycycline on hand, has a refill and he will call if he is running out so we can send in refills for him\\n..\\n.\\n9/22\\nI advised pt to think about Rinvoq for both Crohns (approved ) and for HS (currently under trials not approved), as halina inhibitors may help both conditions since he is struggling with HS \\n\\n9/20\\nCurrently on Humira for rheumatoid arthritis and Crohn’s, pt has Crohn’s disease so cosentyx is not a good idea. For now we will see if the doxycycline helps and if it does we can do that on a low dose for longer term. Pt reports humira worsened his HS which is unfortunate, hopefully halina inhibitors will get approval for HS in near future that can also help his Crohn’s disease \\nRemicade helped HS but not Crohns
Detail Level: Simple
Additional Notes: 3/14/24\\nPatient states scalp serum was not covered under insurance. He is using tea tree oil in place of it. He is using shampoo and clobetasol. Continue using the foam until redness and irritation are relieved. \\n\\n1/23/24\\nPatient stated that his rheumatologist hanged him from Humira to Rinvoq.  Patient has been on it x 3 weeks.\\n.\\n\\n12/13\\nDiscussed rinvoq if rash worsens and he needs to switch off of humira for his Crohn’s disease, rinvoq could make his HS better possibly though not yet fda approved for this indication\\n\\n11/30\\nCould be psoriasis triggered by Humira. Pt is complex also has Crohn’s disease. Discussed Cosentyx approval for HS and psoriasis however it may make his Crohns worse which is not a good option for him. This is a challenging case and we may have to rely on topical agents for psoriasis and UVB. We have discussed rinvoq but again his case is complicated and it is not yet approved for HS.

## 2024-04-29 NOTE — PROCEDURE: PRESCRIPTION MEDICATION MANAGEMENT
Discontinue Regimen: Doxycycline 100 mg bid with each flare for 1-4 weeks depending on severity
Initiate Treatment: Bactrim  mg-160 mg tablet \\nQuantity: 28.0 Tablet\\nSig: Take one PO BID with food x 2 weeks
Detail Level: Zone
Render In Strict Bullet Format?: No
Plan: Since doxycycline can cause sun sensitivity I recommend zinc sunscreens
Continue Regimen: Pt is to continue Rinvoq injections (prescribed by his rheumatologist)\\n\\nclindamycin 1 % topical gel Apply to affected areas bid prn
Continue Regimen: clobetasol 0.05 % topical foam \\nQuantity: 50.0 g  Days Supply: 30\\nSig: Apply to affected areas on scalp and beard bid prn flares\\n\\nketoconazole 2 % shampoo \\nQuantity: 120.0 ml  Days Supply: 30\\nSig: Shampoo three times a week. Leave on scalp and beard , lather for 5-10 minutes, then rinse.

## 2024-05-14 ENCOUNTER — APPOINTMENT (RX ONLY)
Dept: URBAN - METROPOLITAN AREA CLINIC 114 | Facility: CLINIC | Age: 49
Setting detail: DERMATOLOGY
End: 2024-05-14

## 2024-05-14 DIAGNOSIS — L73.2 HIDRADENITIS SUPPURATIVA: ICD-10-CM | Status: INADEQUATELY CONTROLLED

## 2024-05-14 PROCEDURE — 99213 OFFICE O/P EST LOW 20 MIN: CPT

## 2024-05-14 PROCEDURE — ? PRESCRIPTION MEDICATION MANAGEMENT

## 2024-05-14 PROCEDURE — ? DIAGNOSIS COMMENT

## 2024-05-14 PROCEDURE — ? COUNSELING

## 2024-05-14 PROCEDURE — ? ADDITIONAL NOTES

## 2024-05-14 ASSESSMENT — LOCATION ZONE DERM
LOCATION ZONE: TRUNK
LOCATION ZONE: ARM
LOCATION ZONE: LEG

## 2024-05-14 ASSESSMENT — LOCATION DETAILED DESCRIPTION DERM
LOCATION DETAILED: LEFT BUTTOCK
LOCATION DETAILED: LEFT ANTERIOR MEDIAL PROXIMAL UPPER ARM
LOCATION DETAILED: LEFT ANTERIOR PROXIMAL THIGH
LOCATION DETAILED: RIGHT BUTTOCK
LOCATION DETAILED: RIGHT ANTERIOR PROXIMAL THIGH

## 2024-05-14 ASSESSMENT — LOCATION SIMPLE DESCRIPTION DERM
LOCATION SIMPLE: LEFT UPPER ARM
LOCATION SIMPLE: LEFT THIGH
LOCATION SIMPLE: LEFT BUTTOCK
LOCATION SIMPLE: RIGHT BUTTOCK
LOCATION SIMPLE: RIGHT THIGH

## 2024-05-14 NOTE — PROCEDURE: ADDITIONAL NOTES
Render Risk Assessment In Note?: no
Additional Notes: 5/14/24 \\nPatient has had a small lesion pop and drain. Patient states it popped on Saturday, after cleaning area it started to drain. Patient advised to continue covering with bandages and using ointment to heal. Patient reports he has several antibiotic ointments at home. \\n\\n\\n\\n4/29/24\\nPt states the inflamed lesion is doing much better, over the weekend he had a second lesion flare to the size of a “small melon” he stated. It has since calmed down but is still firm. No need for an ilk today, the bactrim seems to be helping. He is still taking the Rinvoq and believes it has help tremendously. Will follow up as needed for now.\\n\\n\\n4/15/24\\nPatient ststaed that he is currently on Doxycycline.  Patient states that the cyst started draining last night \\n\\n\\n\\n10/23/23\\nThe doxycycline does help. The best way to treat the ones that are constant is to removed the entire tract by excising the area. We will give him information to a general surgeon.\\nHis doctor asked about combining biologics, it is not studied and it is not risk free so I do not believe it is a great idea since they are all immunosuppressants.\\n.\\n.\\n9/27/23\\nWill keep doxycycline on hand, has a refill and he will call if he is running out so we can send in refills for him\\n..\\n.\\n9/22\\nI advised pt to think about Rinvoq for both Crohns (approved ) and for HS (currently under trials not approved), as halina inhibitors may help both conditions since he is struggling with HS \\n\\n9/20\\nCurrently on Humira for rheumatoid arthritis and Crohn’s, pt has Crohn’s disease so cosentyx is not a good idea. For now we will see if the doxycycline helps and if it does we can do that on a low dose for longer term. Pt reports humira worsened his HS which is unfortunate, hopefully halina inhibitors will get approval for HS in near future that can also help his Crohn’s disease \\nRemicade helped HS but not Crohns
Detail Level: Simple

## 2024-05-14 NOTE — PROCEDURE: PRESCRIPTION MEDICATION MANAGEMENT
Discontinue Regimen: Doxycycline 100 mg bid with each flare for 1-4 weeks depending on severity
Initiate Treatment: Bactrim  mg-160 mg tablet \\nQuantity: 28.0 Tablet\\nSig: Take one PO BID with food x 2 weeks
Detail Level: Zone
Render In Strict Bullet Format?: No
Plan: Since doxycycline can cause sun sensitivity I recommend zinc sunscreens
Continue Regimen: Pt is to continue Rinvoq injections (prescribed by his rheumatologist)\\n\\nclindamycin 1 % topical gel Apply to affected areas bid prn
DISCHARGE

## 2024-07-26 ENCOUNTER — RX ONLY (OUTPATIENT)
Age: 49
Setting detail: RX ONLY
End: 2024-07-26

## 2024-07-26 RX ORDER — CLINDAMYCIN PHOSPHATE 10 MG/G
GEL TOPICAL
Qty: 60 | Refills: 6 | Status: ERX

## 2024-07-31 ENCOUNTER — APPOINTMENT (RX ONLY)
Dept: URBAN - METROPOLITAN AREA CLINIC 114 | Facility: CLINIC | Age: 49
Setting detail: DERMATOLOGY
End: 2024-07-31

## 2024-07-31 DIAGNOSIS — L72.8 OTHER FOLLICULAR CYSTS OF THE SKIN AND SUBCUTANEOUS TISSUE: ICD-10-CM

## 2024-07-31 DIAGNOSIS — L73.2 HIDRADENITIS SUPPURATIVA: ICD-10-CM

## 2024-07-31 PROCEDURE — ? INTRALESIONAL KENALOG

## 2024-07-31 PROCEDURE — 11900 INJECT SKIN LESIONS </W 7: CPT

## 2024-07-31 PROCEDURE — ? COUNSELING

## 2024-07-31 PROCEDURE — ? ADDITIONAL NOTES

## 2024-07-31 PROCEDURE — 99214 OFFICE O/P EST MOD 30 MIN: CPT | Mod: 25

## 2024-07-31 PROCEDURE — ? PRESCRIPTION MEDICATION MANAGEMENT

## 2024-07-31 PROCEDURE — ? DIAGNOSIS COMMENT

## 2024-07-31 ASSESSMENT — LOCATION DETAILED DESCRIPTION DERM
LOCATION DETAILED: RIGHT ANTERIOR PROXIMAL THIGH
LOCATION DETAILED: RIGHT BUTTOCK
LOCATION DETAILED: LEFT ANTERIOR PROXIMAL THIGH
LOCATION DETAILED: LEFT ANTERIOR MEDIAL PROXIMAL UPPER ARM
LOCATION DETAILED: LEFT BUTTOCK

## 2024-07-31 ASSESSMENT — LOCATION ZONE DERM
LOCATION ZONE: LEG
LOCATION ZONE: ARM
LOCATION ZONE: TRUNK

## 2024-07-31 ASSESSMENT — LOCATION SIMPLE DESCRIPTION DERM
LOCATION SIMPLE: LEFT UPPER ARM
LOCATION SIMPLE: RIGHT BUTTOCK
LOCATION SIMPLE: LEFT THIGH
LOCATION SIMPLE: RIGHT THIGH
LOCATION SIMPLE: LEFT BUTTOCK

## 2024-07-31 NOTE — PROCEDURE: INTRALESIONAL KENALOG
Validate Note Data When Using Inventory: Yes
Require Ndc Code?: No
Detail Level: Detailed
Total Volume (Ccs): 3
X Size Of Lesion In Cm (Optional): 0
Concentration Of Kenalog Solution Injected (Mg/Ml): 10.0
Medical Necessity Clause: This procedure was medically necessary because the lesions that were treated were:
Administered By (Optional): dr Ivory
Which Kenalog Vial Was Used?: Kenalog 40 mg/ml (5 ml vial)
Lot # For Kenalog (Optional): [id number]
Kenalog Preparation: Kenalog
Kenalog Type Of Vial: Multiple Dose
Consent: The risks of atrophy were reviewed with the patient.
Expiration Date For Kenalog (Optional): 2/2026
normal

## 2024-07-31 NOTE — PROCEDURE: PRESCRIPTION MEDICATION MANAGEMENT
Detail Level: Zone
Render In Strict Bullet Format?: No
Continue Regimen: Rinvoq injections (prescribed by his rheumatologist)\\n\\nclindamycin 1 % topical gel Apply to affected areas bid prn\\n\\nDoxycycline 100 mg bid with each flare for 1-4 weeks depending on severity

## 2024-07-31 NOTE — PROCEDURE: ADDITIONAL NOTES
Render Risk Assessment In Note?: no
Additional Notes: 6/31/24\\nPt states he recently was bitten by a lot of fleas and it made him flare very bad. He has been taking the doxycycline at home because his nephrologist does not want him taking bactrim. One of the cultures in the past showed that the doxycycline was resistant to the bacteria but he states the doxycycline is slowly working this time. \\nOne lesion is very inflamed today, ILK injection performed.\\nPrinted the culture results that showed resistance to doxycycline, gave to pt to give to his nephrologist to see what other medication is safe for him to use for when he flares again since the bactrim is not an option. \\n\\n\\n\\n5/14/24 \\nPatient has had a small lesion pop and drain. Patient states it popped on Saturday, after cleaning area it started to drain. Patient advised to continue covering with bandages and using ointment to heal. Patient reports he has several antibiotic ointments at home. \\n\\n\\n\\n4/29/24\\nPt states the inflamed lesion is doing much better, over the weekend he had a second lesion flare to the size of a “small melon” he stated. It has since calmed down but is still firm. No need for an ilk today, the bactrim seems to be helping. He is still taking the Rinvoq and believes it has help tremendously. Will follow up as needed for now.\\n\\n\\n4/15/24\\nPatient ststaed that he is currently on Doxycycline.  Patient states that the cyst started draining last night \\n\\n\\n\\n10/23/23\\nThe doxycycline does help. The best way to treat the ones that are constant is to removed the entire tract by excising the area. We will give him information to a general surgeon.\\[facility] doctor asked about combining biologics, it is not studied and it is not risk free so I do not believe it is a great idea since they are all immunosuppressants.\\n.\\n.\\n9/27/23\\nWill keep doxycycline on hand, has a refill and he will call if he is running out so we can send in refills for him\\n..\\n.\\n9/22\\nI advised pt to think about Rinvoq for both Crohns (approved ) and for HS (currently under trials not approved), as halina inhibitors may help both conditions since he is struggling with HS \\n\\n9/20\\nCurrently on Humira for rheumatoid arthritis and Crohn’s, pt has Crohn’s disease so cosentyx is not a good idea. For now we will see if the doxycycline helps and if it does we can do that on a low dose for longer term. Pt reports humira worsened his HS which is unfortunate, hopefully halina inhibitors will get approval for HS in near future that can also help his Crohn’s disease \\nRemicade helped HS but not Crohns
Detail Level: Simple

## 2024-11-04 ENCOUNTER — APPOINTMENT (RX ONLY)
Dept: URBAN - METROPOLITAN AREA CLINIC 114 | Facility: CLINIC | Age: 49
Setting detail: DERMATOLOGY
End: 2024-11-04

## 2024-11-04 DIAGNOSIS — L08.9 LOCAL INFECTION OF THE SKIN AND SUBCUTANEOUS TISSUE, UNSPECIFIED: ICD-10-CM

## 2024-11-04 DIAGNOSIS — L0390 CELLULITIS AND ABSCESS OF UNSPECIFIED SITES: ICD-10-CM | Status: INADEQUATELY CONTROLLED

## 2024-11-04 DIAGNOSIS — L0391 CELLULITIS AND ABSCESS OF UNSPECIFIED SITES: ICD-10-CM | Status: INADEQUATELY CONTROLLED

## 2024-11-04 PROBLEM — L02.212 CUTANEOUS ABSCESS OF BACK [ANY PART, EXCEPT BUTTOCK]: Status: ACTIVE | Noted: 2024-11-04

## 2024-11-04 PROCEDURE — ? ORDER TESTS

## 2024-11-04 PROCEDURE — ? PRESCRIPTION

## 2024-11-04 PROCEDURE — ? COUNSELING

## 2024-11-04 PROCEDURE — ? ADDITIONAL NOTES

## 2024-11-04 PROCEDURE — ? INTRALESIONAL KENALOG

## 2024-11-04 PROCEDURE — 99214 OFFICE O/P EST MOD 30 MIN: CPT | Mod: 25

## 2024-11-04 PROCEDURE — ? INCISION AND DRAINAGE

## 2024-11-04 PROCEDURE — 10060 I&D ABSCESS SIMPLE/SINGLE: CPT

## 2024-11-04 PROCEDURE — 11900 INJECT SKIN LESIONS </W 7: CPT

## 2024-11-04 RX ORDER — SULFAMETHOXAZOLE AND TRIMETHOPRIM 800; 160 MG/1; MG/1
TABLET ORAL BID
Qty: 28 | Refills: 0 | Status: ERX | COMMUNITY
Start: 2024-11-04

## 2024-11-04 RX ADMIN — SULFAMETHOXAZOLE AND TRIMETHOPRIM: 800; 160 TABLET ORAL at 00:00

## 2024-11-04 ASSESSMENT — LOCATION ZONE DERM: LOCATION ZONE: TRUNK

## 2024-11-04 ASSESSMENT — LOCATION SIMPLE DESCRIPTION DERM: LOCATION SIMPLE: LEFT LOWER BACK

## 2024-11-04 ASSESSMENT — LOCATION DETAILED DESCRIPTION DERM
LOCATION DETAILED: LEFT SUPERIOR MEDIAL MIDBACK
LOCATION DETAILED: LEFT INFERIOR LATERAL MIDBACK
LOCATION DETAILED: LEFT SUPERIOR LATERAL MIDBACK

## 2024-11-04 NOTE — PROCEDURE: INTRALESIONAL KENALOG
How Many Mls Were Removed From The 40 Mg/Ml (5ml) Vial When Preparing The Injectable Solution?: 0
Require Ndc Code?: No
Validate Note Data When Using Inventory: Yes
Administered By (Optional): MD Dianelys
Medical Necessity Clause: This procedure was medically necessary because the lesions that were treated were:
Consent: The risks of atrophy were reviewed with the patient.
Total Volume (Ccs): 2.0
Detail Level: Detailed
Treatment Number (Optional): 1
Kenalog Preparation: Kenalog
Kenalog Type Of Vial: Multiple Dose
Which Kenalog Vial Was Used?: Kenalog 10 mg/ml (5 ml vial)
Concentration Of Kenalog Solution Injected (Mg/Ml): 10.0

## 2024-11-04 NOTE — PROCEDURE: ORDER TESTS
Bill For Surgical Tray: no
Billing Type: Third-Party Bill
Performing Laboratory: [id number]
Expected Date Of Service: 11/04/2024
Lab Facility: [id number]

## 2024-11-04 NOTE — PROCEDURE: COUNSELING
Detail Level: Detailed
Quality 357: Surgical Site Infection (Ssi): No surgical site infection
Quality 355: Unplanned Reoperation Within The 30 Day Postoperative Period: No return to the operating room for a surgical procedure, for complications of the principal operative procedure, within 30 days of the principal operative procedure

## 2024-11-04 NOTE — HPI: SKIN LESIONS
Is This A New Presentation, Or A Follow-Up?: Skin Lesions
How Severe Is Your Skin Lesion?: mild
Have Your Skin Lesions Been Treated?: not been treated
Additional History: Patient reports possible insect bite, concerned about staph infection. Unsure if caused or exacerbated by HS

## 2024-11-04 NOTE — PROCEDURE: ADDITIONAL NOTES
Render Risk Assessment In Note?: no
Detail Level: Simple
Additional Notes: Nephrology prefers to avoid Bactrim however previous culture result showed resistance to clindamycin and other oral antibiotic medications. Suspect current abscess is also positive for the same bacterial infection, will prescribe Bactrim again and have patient consult with nephrology regarding Bactrim or if other abx is recommended.\\n\\nPatient will also discuss Tramadol with nephrology and contact office if Rx is requested.\\nRecommend extra strength Tylenol for pain and avoid ibuprofen.

## 2024-11-04 NOTE — PROCEDURE: INCISION AND DRAINAGE
Detail Level: Detailed
Lesion Type: Abscess
Method: 11 blade
Curette: No
Anesthesia Type: 1% lidocaine with epinephrine
Anesthesia Volume In Cc: 8
Size Of Lesion In Cm (Optional But May Be Required For Some Insurances): 0
Drainage Amount?: moderate
Drainage Type?: bloody and cyst-like
Dressing: dry sterile dressing
Epidermal Sutures: 4-0 Ethilon
Epidermal Closure: simple interrupted
Suture Text: The incision was partially closed with
Preparation Text: The area was prepped in the usual clean fashion.
Curette Text (Optional): After the contents were expressed a curette was used to partially remove the cyst wall.
Consent was obtained and risks were reviewed including but not limited to delayed wound healing, infection, need for multiple I and D's, and pain.
Post-Care Instructions: I reviewed with the patient in detail post-care instructions. Patient should keep wound covered and call the office should any redness, pain, swelling or worsening occur.

## 2024-11-05 ENCOUNTER — RX ONLY (OUTPATIENT)
Age: 49
Setting detail: RX ONLY
End: 2024-11-05

## 2024-11-05 RX ORDER — CEPHALEXIN 250 MG/1
TABLET ORAL
Qty: 14 | Refills: 0 | Status: ERX | COMMUNITY
Start: 2024-11-05

## 2024-11-05 RX ORDER — TRAMADOL HYDROCHLORIDE 50 MG/1
TABLET, FILM COATED ORAL
Qty: 30 | Refills: 0 | Status: ERX | COMMUNITY
Start: 2024-11-05

## 2024-11-18 ENCOUNTER — APPOINTMENT (RX ONLY)
Dept: URBAN - METROPOLITAN AREA CLINIC 114 | Facility: CLINIC | Age: 49
Setting detail: DERMATOLOGY
End: 2024-11-18

## 2024-11-18 ENCOUNTER — RX ONLY (OUTPATIENT)
Age: 49
Setting detail: RX ONLY
End: 2024-11-18

## 2024-11-18 DIAGNOSIS — L0390 CELLULITIS AND ABSCESS OF UNSPECIFIED SITES: ICD-10-CM | Status: RESOLVING

## 2024-11-18 DIAGNOSIS — L0391 CELLULITIS AND ABSCESS OF UNSPECIFIED SITES: ICD-10-CM | Status: RESOLVING

## 2024-11-18 PROBLEM — L02.212 CUTANEOUS ABSCESS OF BACK [ANY PART, EXCEPT BUTTOCK]: Status: ACTIVE | Noted: 2024-11-18

## 2024-11-18 PROBLEM — L08.9 LOCAL INFECTION OF THE SKIN AND SUBCUTANEOUS TISSUE, UNSPECIFIED: Status: ACTIVE | Noted: 2024-11-18

## 2024-11-18 PROCEDURE — ? ADDITIONAL NOTES

## 2024-11-18 PROCEDURE — ? PRESCRIPTION MEDICATION MANAGEMENT

## 2024-11-18 PROCEDURE — ? COUNSELING

## 2024-11-18 PROCEDURE — 99213 OFFICE O/P EST LOW 20 MIN: CPT

## 2024-11-18 RX ORDER — CEPHALEXIN 250 MG/1
250MG TABLET ORAL BID
Qty: 14 | Refills: 0 | Status: ERX

## 2024-11-18 ASSESSMENT — LOCATION SIMPLE DESCRIPTION DERM: LOCATION SIMPLE: LEFT LOWER BACK

## 2024-11-18 ASSESSMENT — LOCATION DETAILED DESCRIPTION DERM: LOCATION DETAILED: LEFT SUPERIOR LATERAL MIDBACK

## 2024-11-18 ASSESSMENT — LOCATION ZONE DERM: LOCATION ZONE: TRUNK

## 2024-11-18 NOTE — PROCEDURE: PRESCRIPTION MEDICATION MANAGEMENT
Continue Regimen: Cephalexin 250mg po bid x7 days RF sent at ov today
Detail Level: Zone
Render In Strict Bullet Format?: No

## 2024-11-18 NOTE — PROCEDURE: ADDITIONAL NOTES
Render Risk Assessment In Note?: no
Detail Level: Simple
Additional Notes: 11/18/2024\\nResidual prurulent discharge will continue cephalexin x1 additional week \\nCx + for strep\\nF/U prn\\n\\n11/04/2024\\n\\nNephrology prefers to avoid Bactrim however previous culture result showed resistance to clindamycin and other oral antibiotic medications. Suspect current abscess is also positive for the same bacterial infection, will prescribe Bactrim again and have patient consult with nephrology regarding Bactrim or if other abx is recommended.\\n\\nPatient will also discuss Tramadol with nephrology and contact office if Rx is requested.\\nRecommend extra strength Tylenol for pain and avoid ibuprofen.
Additional Notes: Nephrology prefers to avoid Bactrim however previous culture result showed resistance to clindamycin and other oral antibiotic medications. Suspect current abscess is also positive for the same bacterial infection, will prescribe Bactrim again and have patient consult with nephrology regarding Bactrim or if other abx is recommended.\\n\\nPatient will also discuss Tramadol with nephrology and contact office if Rx is requested.\\nRecommend extra strength Tylenol for pain and avoid ibuprofen.